# Patient Record
Sex: FEMALE | Race: WHITE | URBAN - METROPOLITAN AREA
[De-identification: names, ages, dates, MRNs, and addresses within clinical notes are randomized per-mention and may not be internally consistent; named-entity substitution may affect disease eponyms.]

---

## 2021-10-28 ENCOUNTER — OFFICE VISIT (OUTPATIENT)
Dept: ORTHOPEDIC SURGERY | Age: 76
End: 2021-10-28
Payer: MEDICARE

## 2021-10-28 VITALS — BODY MASS INDEX: 25.49 KG/M2 | WEIGHT: 135 LBS | HEIGHT: 61 IN

## 2021-10-28 DIAGNOSIS — M54.50 LOW BACK PAIN, UNSPECIFIED BACK PAIN LATERALITY, UNSPECIFIED CHRONICITY, UNSPECIFIED WHETHER SCIATICA PRESENT: ICD-10-CM

## 2021-10-28 DIAGNOSIS — M47.816 LUMBAR SPONDYLOSIS: ICD-10-CM

## 2021-10-28 DIAGNOSIS — Z96.653 STATUS POST TOTAL BILATERAL KNEE REPLACEMENT: Primary | ICD-10-CM

## 2021-10-28 DIAGNOSIS — M48.061 SPINAL STENOSIS OF LUMBAR REGION, UNSPECIFIED WHETHER NEUROGENIC CLAUDICATION PRESENT: ICD-10-CM

## 2021-10-28 DIAGNOSIS — M51.36 DDD (DEGENERATIVE DISC DISEASE), LUMBAR: ICD-10-CM

## 2021-10-28 PROCEDURE — 99203 OFFICE O/P NEW LOW 30 MIN: CPT | Performed by: ORTHOPAEDIC SURGERY

## 2021-10-28 RX ORDER — PREDNISONE 10 MG/1
TABLET ORAL
Qty: 42 TABLET | Refills: 0 | Status: SHIPPED | OUTPATIENT
Start: 2021-10-28

## 2021-10-28 NOTE — PROGRESS NOTES
April Solis  [id number]  October 28, 2021    Chief Complaint   Patient presents with    Knee Pain     Bilateral       History: The patient is a 22-year-old female who is here for evaluation of both knees. She underwent a right total knee arthroplasty back in April 2016 by myself. She underwent a left total knee arthroplasty back in March 2015 by myself. She has been having bilateral knee pain. Left knee pain is rated as 5/10. The right knee pain is rated as 7/10. The pain radiates from her right anterior thigh down into her calf. The pain in the left lower extremity is similar, but much less severe. The patient does have a past medical history remarkable for rheumatoid arthritis, hypertension and hyperlipidemia. She denies any history of recent injury. She denies any numbness or tingling. The patient's  past medical history, medications, allergies,  family history, social history, and have been reviewed, and dated and are recorded in the chart. Pertinent items are noted in HPI. Review of systems reviewed from Pertinent History Form dated on 10/28/2021 and available in the patient's chart under the Media tab. Vitals:  Ht 5' 1\" (1.549 m)   Wt 135 lb (61.2 kg)   BMI 25.51 kg/m²     Physical: Ms. April Solis appears well, she is in no apparent distress, she demonstrates appropriate mood & affect. She is alert and oriented to person, place and time. Examination of the bilateral lower extremity reveals no pain with range of motion of the hips. Examination of the knees does reveal well-healed total knee scars. There is no evidence of erythema or warmth. Range of motion is from 0 degrees to 125 degrees bilaterally. Strength is 4+ to 5/5 for all muscle groups about the bilateral knees. There is very mild crepitus with range of motion of the left knee. Varus and valgus stressing of the knees reveals no evidence of instability. There is no effusion in the bilateral knee.  Anterior drawer and Lachman are negative bilaterally. Tension signs in the right lower extremity are slightly positive. The patient does have slight weakness with great toe extension and with ankle dorsi flexion. Examination of the skin reveals no rashes, ulceration, or lesion, bilaterally in the lower extremities. Sensation to both lower extremities is grossly intact. Exam of both feet reveals pedal pulses intact and brisk cap refill. Patient is able to dorsiflex and wiggle all toes. Deep tendon reflexes of the lower extremities are normal and symmetric. X-rays: 3 views of both knees obtained in the office today were extensively reviewed. The prosthesis is well aligned bilaterally. There is no evidence of loosening. There are no lytic or blastic lesions within the bone. 3 views of the lumbar spine obtained in the office today were extensively reviewed. The x-rays confirm severe degenerative disc disease. The changes are most severe at the L3-L4 level and the L2-L3 level. She also has severe lumbar spondylosis. Impression: #1 status post bilateral total knee arthroplasty #2 lumbar degenerative disc disease #3 lumbar spondylosis with stenosis    Plan: At this time, we discussed our treatment options at length. She was given a prednisone taper. She was encouraged to modify her activities. We instructed her on proper lifting, pushing and pulling. We will send her to the spine team for evaluation and treatment.